# Patient Record
Sex: MALE | Race: BLACK OR AFRICAN AMERICAN | NOT HISPANIC OR LATINO | Employment: FULL TIME | URBAN - METROPOLITAN AREA
[De-identification: names, ages, dates, MRNs, and addresses within clinical notes are randomized per-mention and may not be internally consistent; named-entity substitution may affect disease eponyms.]

---

## 2018-10-08 ENCOUNTER — TELEPHONE (OUTPATIENT)
Dept: OBSTETRICS AND GYNECOLOGY | Facility: CLINIC | Age: 33
End: 2018-10-08

## 2018-10-08 RX ORDER — METRONIDAZOLE 500 MG/1
500 TABLET ORAL EVERY 12 HOURS
Qty: 14 TABLET | Refills: 0 | Status: SHIPPED | OUTPATIENT
Start: 2018-10-08 | End: 2018-10-15

## 2024-04-03 ENCOUNTER — HOSPITAL ENCOUNTER (EMERGENCY)
Facility: HOSPITAL | Age: 39
Discharge: HOME | End: 2024-04-03
Attending: EMERGENCY MEDICINE

## 2024-04-03 VITALS
TEMPERATURE: 98 F | DIASTOLIC BLOOD PRESSURE: 88 MMHG | HEART RATE: 79 BPM | BODY MASS INDEX: 37.77 KG/M2 | RESPIRATION RATE: 16 BRPM | SYSTOLIC BLOOD PRESSURE: 178 MMHG | WEIGHT: 285 LBS | OXYGEN SATURATION: 100 % | HEIGHT: 73 IN

## 2024-04-03 DIAGNOSIS — L02.91 ABSCESS: Primary | ICD-10-CM

## 2024-04-03 PROCEDURE — 10060 I&D ABSCESS SIMPLE/SINGLE: CPT

## 2024-04-03 PROCEDURE — 99282 EMERGENCY DEPT VISIT SF MDM: CPT

## 2024-04-03 RX ORDER — LIDOCAINE HYDROCHLORIDE 10 MG/ML
10 INJECTION INFILTRATION; PERINEURAL
Status: DISCONTINUED | OUTPATIENT
Start: 2024-04-03 | End: 2024-04-03 | Stop reason: HOSPADM

## 2024-04-03 RX ORDER — SULFAMETHOXAZOLE AND TRIMETHOPRIM 800; 160 MG/1; MG/1
2 TABLET ORAL 2 TIMES DAILY
Qty: 28 TABLET | Refills: 0 | Status: SHIPPED | OUTPATIENT
Start: 2024-04-03 | End: 2024-04-03 | Stop reason: CLARIF

## 2024-04-03 RX ORDER — SULFAMETHOXAZOLE AND TRIMETHOPRIM 800; 160 MG/1; MG/1
2 TABLET ORAL 2 TIMES DAILY
Qty: 28 TABLET | Refills: 0 | Status: SHIPPED | OUTPATIENT
Start: 2024-04-03 | End: 2024-04-03

## 2024-04-03 NOTE — ED PROVIDER NOTES
Encounter Date: 4/3/2024       History     Chief Complaint   Patient presents with    Cyst     Patient states he has a cyst x 1 week on his right side cheek , has had some discharge      Patient is a 38 y.o. male who presents to the ED 04/03/2024 with a chief complaint of Cyst to face for about a week.  Wife attempted to stick a needle in it and drain it at home.  She states some pus came out but it closed quickly.  He has not had any fever.  He has had 1 similar to this on his face in the past that resolved with warm compresses.  He states this 1 is slightly worse.  He states he otherwise feels well.  He denies any past medical problems or history or allergies.             Review of patient's allergies indicates:  No Known Allergies  No past medical history on file.  No past surgical history on file.  Family History   Problem Relation Age of Onset    Cancer Mother         breast cancer 48 at diagnosis; recurred at 52    No Known Problems Father     Diabetes Maternal Grandmother     Stroke Maternal Grandmother     Colon cancer Neg Hx     Prostate cancer Neg Hx     Hyperlipidemia Neg Hx     Hypertension Neg Hx     Heart disease Neg Hx      Social History     Tobacco Use    Smoking status: Never   Substance Use Topics    Alcohol use: Yes     Alcohol/week: 10.0 standard drinks of alcohol     Types: 10 Shots of liquor per week     Comment: 20 oz throughout the week    Drug use: No     Review of Systems   Constitutional:  Negative for chills, diaphoresis and fever.   HENT:  Negative for sore throat.    Respiratory:  Negative for chest tightness and shortness of breath.    Cardiovascular:  Negative for chest pain.   Genitourinary:  Negative for dysuria.   Musculoskeletal:  Negative for arthralgias and myalgias.   Skin:  Positive for wound. Negative for rash.   Hematological:  Does not bruise/bleed easily.       Physical Exam     Initial Vitals   BP Pulse Resp Temp SpO2   04/03/24 1602 04/03/24 1602 04/03/24 1602 04/03/24  "1604 04/03/24 1602   (!) 164/79 86 19 98.5 °F (36.9 °C) 97 %      MAP       --                Physical Exam    Nursing note and vitals reviewed.  Constitutional: He appears well-developed and well-nourished.   HENT:   Head: Normocephalic and atraumatic.       Right upper cheek with 2 x 2 cm area of fluctuant raised erythema with central pustule. No trismus. No orbital involvement. No directly overlying sinus.   Eyes: Conjunctivae are normal. Pupils are equal, round, and reactive to light. Right eye exhibits no discharge. Left eye exhibits no discharge.   Neck: Neck supple.   Normal range of motion.  Cardiovascular:  Normal rate, regular rhythm, normal heart sounds and intact distal pulses.           Pulmonary/Chest: Breath sounds normal.   Musculoskeletal:         General: Normal range of motion.      Cervical back: Normal range of motion and neck supple.     Neurological: He is alert and oriented to person, place, and time. He has normal strength. No sensory deficit.   Skin: Skin is warm and dry.   Psychiatric: He has a normal mood and affect.         ED Course   I & D - Incision and Drainage    Date/Time: 4/3/2024 3:44 PM  Location procedure was performed: [facility] EMERGENCY DEPARTMENT    Performed by: Chanda Hanson NP  Authorized by: Noah Fischer MD  Consent Done: Yes  Consent: Verbal consent obtained.  Risks and benefits: risks, benefits and alternatives were discussed  Consent given by: patient  Patient identity confirmed: name  Time out: Immediately prior to procedure a "time out" was called to verify the correct patient, procedure, equipment, support staff and site/side marked as required.  Type: abscess  Body area: head/neck  Location details: face  Anesthesia: local infiltration    Anesthesia:  Local Anesthetic: lidocaine 1% without epinephrine  Anesthetic total: 2 mL    Patient sedated: no  Scalpel size: 11  Incision type: single straight  Incision depth: dermal  Complexity: simple  Drainage: " pus  Drainage amount: moderate  Wound treatment: incision, drainage, deloculation, wound left open and expression of material  Complications: No  Specimens: No  Implants: No  Patient tolerance: Patient tolerated the procedure well with no immediate complications    Incision depth: dermal        Labs Reviewed - No data to display       Imaging Results    None          Medications   LIDOcaine HCL 10 mg/ml (1%) injection 10 mL (has no administration in time range)     Medical Decision Making  Risk  Prescription drug management.         APC / Resident Notes:   Patient is a 38 y.o. male who presents to the ED 04/03/2024 who underwent emergent evaluation for abscess to face for 1 week. No systemic signs of infection.  Patient well-appearing.  Discussed risk versus benefit of I and D and possible scarring related to this.  Patient would prefer I&D at this time.  I&D performed as above the patient tolerated well.  He is placed on Bactrim.  I do not think admission for IV antibiotics indicated at this time.  Do not think any deep space infection.  Not think any further imaging or CT indicated at this time.  His tetanus is up-to-date.  Recommend warm compresses, and Tylenol, and ibuprofen.  Based on my clinical evaluation, I do not appreciate any immediate, emergent, or life threatening condition or etiology that warrants additional workup today and feel that the patient can be discharged with close follow up care. Case discussed with Dr. Fischer who is agreeable to plan of care. Follow up and return precautions discussed; patient verbalized understanding and is agreeable to plan of care. Patient discharged home in stable condition.                             Medical Decision Making:   Differential Diagnosis:   Abscess  Folliculitis  cyst             Clinical Impression:  Final diagnoses:  [L02.91] Abscess (Primary)          ED Disposition Condition    Discharge Stable          ED Prescriptions       Medication Sig Dispense  Start Date End Date Auth. Provider    sulfamethoxazole-trimethoprim 800-160mg (BACTRIM DS) 800-160 mg Tab Take 2 tablets by mouth 2 (two) times daily. for 7 days 28 tablet 4/3/2024 4/10/2024 Chanda Hanson NP          Follow-up Information       Follow up With Specialties Details Why Contact Info Additional Information    Alok Calderon MD Internal Medicine In 1 week For wound re-check [address] [facility] [address]  [phone]       [facility] - ED Emergency Medicine  As needed, If symptoms worsen [address] [place] [address] Dr [place] [address] 1st floor             Chanda Hanson NP  04/03/24 5302

## 2024-04-03 NOTE — ED NOTES
Upon discharge, patient is AAOx4, no cardiac or respiratory complications. Follow up care and  Medications have been reviewed with patient and has been instructed to return to the ER if needed. Patient verbalized understanding and ambulated to the lobby without difficulty. MARCIN SILVA.

## 2024-04-03 NOTE — FIRST PROVIDER EVALUATION
Emergency Department TeleTriage Encounter Note      CHIEF COMPLAINT    Chief Complaint   Patient presents with    Cyst     Patient states he has a cyst x 1 week on his right side cheek , has had some discharge        VITAL SIGNS   Initial Vitals   BP Pulse Resp Temp SpO2   04/03/24 1602 04/03/24 1602 04/03/24 1602 04/03/24 1604 04/03/24 1602   (!) 164/79 86 19 98.5 °F (36.9 °C) 97 %      MAP       --                   ALLERGIES    Review of patient's allergies indicates:  No Known Allergies    PROVIDER TRIAGE NOTE  Patient presents with complaint of abscess noted to face.  Reports no fevers      Phy:   Constitutional: well nourished, well developed, appearing stated age, NAD        Initial orders will be placed and care will be transferred to an alternate provider when patient is roomed for a full evaluation. Any additional orders and the final disposition will be determined by that provider.        ORDERS  Labs Reviewed - No data to display    ED Orders (720h ago, onward)      None              Virtual Visit Note: The provider triage portion of this emergency department evaluation and documentation was performed via [employer], a HIPAA-compliant telemedicine application, in concert with a tele-presenter in the room. A face to face patient evaluation with one of my colleagues will occur once the patient is placed in an emergency department room.      DISCLAIMER: This note was prepared with [employer] voice recognition transcription software. Garbled syntax, mangled pronouns, and other bizarre constructions may be attributed to that software system.

## 2024-04-03 NOTE — ED NOTES
Redd Clark presents to the ED with c/o abscess to right cheek. Patient reports that he sometimes gets an ingrown hair. Abscess is draining at this time. Patient denies any fever. There is erythema to [place] wound. Mucous membranes are pink and moist. Skin is warm, dry and intact. SHIRA VSS  Verified patient's name and date of birth.

## 2024-10-30 ENCOUNTER — PATIENT MESSAGE (OUTPATIENT)
Dept: RESEARCH | Facility: HOSPITAL | Age: 39
End: 2024-10-30

## 2024-11-21 ENCOUNTER — OFFICE VISIT (OUTPATIENT)
Dept: URGENT CARE | Facility: CLINIC | Age: 39
End: 2024-11-21
Payer: COMMERCIAL

## 2024-11-21 VITALS
HEIGHT: 73 IN | DIASTOLIC BLOOD PRESSURE: 83 MMHG | SYSTOLIC BLOOD PRESSURE: 142 MMHG | WEIGHT: 285 LBS | BODY MASS INDEX: 37.77 KG/M2 | OXYGEN SATURATION: 97 % | HEART RATE: 75 BPM | RESPIRATION RATE: 18 BRPM | TEMPERATURE: 98 F

## 2024-11-21 DIAGNOSIS — R21 RASH OF HAND: Primary | ICD-10-CM

## 2024-11-21 DIAGNOSIS — Z11.3 SCREENING EXAMINATION FOR STI: ICD-10-CM

## 2024-11-21 LAB
HCV AB SERPL QL IA: NORMAL
HIV 1+2 AB+HIV1 P24 AG SERPL QL IA: NORMAL
TREPONEMA PALLIDUM IGG+IGM AB [PRESENCE] IN SERUM OR PLASMA BY IMMUNOASSAY: NONREACTIVE

## 2024-11-21 PROCEDURE — 87389 HIV-1 AG W/HIV-1&-2 AB AG IA: CPT

## 2024-11-21 PROCEDURE — 86803 HEPATITIS C AB TEST: CPT

## 2024-11-21 PROCEDURE — 86593 SYPHILIS TEST NON-TREP QUANT: CPT

## 2024-11-21 RX ORDER — BETAMETHASONE DIPROPIONATE 0.5 MG/G
OINTMENT TOPICAL 2 TIMES DAILY
Qty: 45 G | Refills: 0 | Status: SHIPPED | OUTPATIENT
Start: 2024-11-21 | End: 2024-11-21

## 2024-11-21 RX ORDER — BETAMETHASONE DIPROPIONATE 0.5 MG/G
OINTMENT TOPICAL 2 TIMES DAILY
Qty: 45 G | Refills: 0 | Status: SHIPPED | OUTPATIENT
Start: 2024-11-21

## 2024-11-21 NOTE — PATIENT INSTRUCTIONS
Apply steroid cream as directed (wear gloves at night to keep the moisture in). Try an oral anti-histamine like zyrtec during the day for itching. Over the counter Pepcid twice per day can be helpful (helps decrease inflammation).  You can try benadryl at night for itching. This will make you drowsy. Make sure not to drive, drink alcohol, operate machinery or take other sedating medications while taking this.      Apply ice packs or wet cloths to the rash to reduce itching. Keep cool and stay out of the sun. Leave rash open to the air as much as you can. Avoid using fragrant soaps as this can cause irritation. Only use gentle soaps. Stay moisturized. Monitor for triggers and avoid them.     Alternate tylenol and ibuprofen every 4 hours for pain and to decrease inflammation. Always take ibuprofen with food and water to decrease GI upset. Stop taking ibuprofen if you develop abdominal pain or blood in stool.     Drink plenty of fluids daily (water is best).   The recommended daily fluid intake for men is 3.7 liters (seven 16 oz bottles).    Eat nutritious foods high in antioxidants that include fruits and vegetables. Try a Mediterranean diet to decrease inflammation and will help boost your immune system.     Getting enough sleep at night (7-8 hours), eating nutritious foods, managing stress levels and maintaining physical activity (3 days a week of 45 min of aerobic activity) can all help your immune system.    Follow up with PCP for continued symptoms in 1 week.     Go to the ER if you have shortness of breath, mouth/throat swelling, chest pain, palpitations, dizziness.               ([facility] recommendation)  The Mediterranean diet is based on plant-based foods and healthy fats.     You eat LOTS of vegetables, fruits, beans, lentils, nuts, whole grains (wheat bread, brown rice) & extra virgin olive oil.   ---- These foods are high in fiber and antioxidants.   ---- These nutrients help reduce inflammation.   ----  Fiber helps regulate bowel movements.   ---- Antioxidants protect you against cancer.     Eat a moderate amount of fish (salmon, herring, mackerel, sardines, anchovies, halibut, rainbow trout, tuna)   ---- (fish are high in omega-3 fatty acids)    Eat a moderate amount of cheese and yogurt.    Eat little or no meat-- eat more poultry (baked chicken, grilled chicken, ground turkey)  ---- avoid red meat and pork (causes inflammation and linked to colon cancer)    Eat little or no sweats, sugary drinks or butter.     Limit your sodium intake. A diet high in salt can increase your blood pressure and predisposes you to a heart attack or stroke.     BENEFITS OF DIET  --- Lowers your risk of cardiovascular disease (heart attack, stroke) and many other diseases.   --- Supports a healthy body weight.   --- Supports a healthy blood sugar, blood pressure and cholesterol.   --- Lowers your risk of metabolic syndrome   --- Supports a heathy balance of good gut bacteria in your digestive system.   --- Lowers your risk for cancer.   --- Helps with brain function and slows decline as we age.   --- Helps you live longer.     Talk to your primary care doctor about a dietician referral for further guidance.

## 2024-11-21 NOTE — PROGRESS NOTES
"Subjective:      Patient ID: Redd Clark is a 39 y.o. male.    Vitals:  height is 6' 1" (1.854 m) and weight is 129.3 kg (285 lb). His oral temperature is 97.8 °F (36.6 °C). His blood pressure is 142/83 (abnormal) and his pulse is 75. His respiration is 18 and oxygen saturation is 97%.     Chief Complaint: Rash    40 yo male with PMH varicella c/o bilateral hand rash. Sx began yesterday. Pt reports tingling sensation on hands when flexing/stretching, itchiness, painful to touch (sore), and swelling. Pt presents with macular rash. Pt reports 2 days ago he was feeling light headed and sweats, reports no fever and sx went away. Pt denies environmental exposure (admits using hands heavily at work but states he typically uses gloves and up keeps hand hygiene). Pt denies congestion, cough, diarrhea, congestion, chest pain, abdominal pain, fatigue, fever, rhinorrhea, SOB, sore throat, nausea, and emesis. Pt reports having chicken pox as a child, denies shingles vaccination. Pt reports self medicating with antibacterial cream with no relief. Pt reports pain level is a 2.    Rash  This is a new problem. The current episode started yesterday. The problem is unchanged. The affected locations include the left hand and right hand. The rash is characterized by itchiness, redness and swelling. He was exposed to nothing. Pertinent negatives include no anorexia, congestion, cough, diarrhea, eye pain, facial edema, fatigue, fever, joint pain, nail changes, rhinorrhea, shortness of breath, sore throat or vomiting. Past treatments include antibiotic cream. The treatment provided no relief. There is no history of allergies, asthma, eczema or varicella.       Constitution: Negative for fatigue and fever.   HENT:  Negative for congestion and sore throat.    Eyes:  Negative for eye pain.   Respiratory:  Negative for cough and shortness of breath.    Gastrointestinal:  Negative for vomiting and diarrhea.   Genitourinary:  Negative " for dysuria, genital sore, penile discharge, penile pain, penile swelling, scrotal swelling and pelvic pain.   Musculoskeletal:  Positive for pain. Negative for trauma, abnormal ROM of joint and arthritis.   Skin:  Positive for rash. Negative for erythema.   Allergic/Immunologic: Positive for itching.      Objective:     Physical Exam   Constitutional: He is oriented to person, place, and time. He appears well-developed.   HENT:   Head: Normocephalic and atraumatic. Head is without abrasion, without contusion and without laceration.   Ears:   Right Ear: External ear normal.   Left Ear: External ear normal.   Nose: Nose normal.   Mouth/Throat: Oropharynx is clear and moist and mucous membranes are normal.   Eyes: Conjunctivae, EOM and lids are normal. Pupils are equal, round, and reactive to light.   Neck: Trachea normal and phonation normal. Neck supple.   Cardiovascular: Normal rate, regular rhythm and normal heart sounds.   Pulmonary/Chest: Effort normal and breath sounds normal. No stridor. No respiratory distress.   Musculoskeletal: Normal range of motion.         General: Normal range of motion.   Neurological: He is alert and oriented to person, place, and time.   Skin: Skin is warm, dry, intact, rash (macular rash to bilateral palms, swelling to hands, mild TTP, no drainage) and macular. Capillary refill takes less than 2 seconds. No abrasion, No burn, No bruising, No erythema and No ecchymosis   Psychiatric: His speech is normal and behavior is normal. Judgment and thought content normal.   Nursing note and vitals reviewed.        Assessment:     1. Rash of hand    2. Screening examination for STI        Plan:       Rash of hand  -     Treponema Pallidium Antibodies IgG, IgM  -     betamethasone dipropionate (DIPROLENE) 0.05 % ointment; Apply topically 2 (two) times daily.  Dispense: 45 g; Refill: 0    Screening examination for STI  -     HIV 1/2 Ag/Ab (4th Gen)  -     Hepatitis C Antibody    Other orders  -      Discontinue: betamethasone dipropionate (DIPROLENE) 0.05 % ointment; Apply topically 2 (two) times daily.  Dispense: 45 g; Refill: 0            Discussed results/diagnosis/plan with patient in clinic. Strict precautions given to patient to monitor for worsening signs and symptoms. Advised to follow up with PCP or specialist.  Explained side effects of medications prescribed with patient and informed him/her to discontinue use if he/she has any side effects and to inform UC or PCP if this occurs. All questions answered. Strict ED verses clinic return precautions stressed and given in depth. Advised if symptoms worsens of fail to improve he/she should go to the Emergency Room. Discharge and follow-up instructions given verbally/printed with the patient who expressed understanding and willingness to comply with my recommendations. Patient voiced understanding and in agreement with current treatment plan. Patient exits the exam room in no acute distress. Conversant and engaged during discharge discussion, verbalized understanding.

## 2024-12-30 ENCOUNTER — TELEPHONE (OUTPATIENT)
Dept: PODIATRY | Facility: CLINIC | Age: 39
End: 2024-12-30
Payer: COMMERCIAL

## 2024-12-30 ENCOUNTER — OFFICE VISIT (OUTPATIENT)
Dept: URGENT CARE | Facility: CLINIC | Age: 39
End: 2024-12-30
Payer: COMMERCIAL

## 2024-12-30 VITALS
HEIGHT: 73 IN | SYSTOLIC BLOOD PRESSURE: 131 MMHG | OXYGEN SATURATION: 98 % | RESPIRATION RATE: 18 BRPM | WEIGHT: 314.63 LBS | TEMPERATURE: 98 F | DIASTOLIC BLOOD PRESSURE: 83 MMHG | HEART RATE: 93 BPM | BODY MASS INDEX: 41.7 KG/M2

## 2024-12-30 DIAGNOSIS — M79.671 RIGHT FOOT PAIN: ICD-10-CM

## 2024-12-30 DIAGNOSIS — M72.2 PLANTAR FASCIITIS OF RIGHT FOOT: Primary | ICD-10-CM

## 2024-12-30 PROCEDURE — 73630 X-RAY EXAM OF FOOT: CPT | Mod: RT,S$GLB,, | Performed by: RADIOLOGY

## 2024-12-30 RX ORDER — KETOROLAC TROMETHAMINE 30 MG/ML
30 INJECTION, SOLUTION INTRAMUSCULAR; INTRAVENOUS
Status: COMPLETED | OUTPATIENT
Start: 2024-12-30 | End: 2024-12-30

## 2024-12-30 RX ORDER — IBUPROFEN 800 MG/1
800 TABLET ORAL 3 TIMES DAILY
Qty: 15 TABLET | Refills: 0 | Status: SHIPPED | OUTPATIENT
Start: 2024-12-30 | End: 2025-01-04

## 2024-12-30 RX ADMIN — KETOROLAC TROMETHAMINE 30 MG: 30 INJECTION, SOLUTION INTRAMUSCULAR; INTRAVENOUS at 11:12

## 2024-12-30 NOTE — LETTER
December 30, 2024      [facility] [address] [address]  Phone: [phone]  Fax: [phone]       Patient: Redd Clark   YOB: 1985  Date of Visit: 12/30/2024    To Whom It May Concern:    Dale Clark  was at [facility] on 12/30/2024. The patient may return to work/school on 12/31/2024 with no restrictions. If you have any questions or concerns, or if I can be of further assistance, please do not hesitate to contact me.    Sincerely,    Joslyn Moya, DNP

## 2024-12-30 NOTE — PROGRESS NOTES
"Subjective:      Patient ID: Redd Clark is a 39 y.o. male.    Vitals:  height is 6' 1" (1.854 m) and weight is 142.7 kg (314 lb 9.5 oz) (abnormal). His oral temperature is 98.2 °F (36.8 °C). His blood pressure is 131/83 and his pulse is 93. His respiration is 18 and oxygen saturation is 98%.     Chief Complaint: Pain    Pt presents today w/ rt foot pain ; onset approx 2x weeks ago. Pt c/o slight edema and decrease in ability to bear full weight ; denies any direct trauma , loss of ROM and loss of sensation. Pt reports pain to the sides and heel of foot. 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 Pt tried arthritis pain reliever cream and warm compress;no relief.      Pain  This is a new problem. The current episode started 1 to 4 weeks ago. The problem occurs constantly. The problem has been unchanged. Associated symptoms include joint swelling. Pertinent negatives include no abdominal pain, anorexia, arthralgias, change in bowel habit, chest pain, chills, congestion, coughing, diaphoresis, fatigue, fever, headaches, myalgias, nausea, neck pain, numbness, rash, sore throat, swollen glands, urinary symptoms, vertigo, visual change, vomiting or weakness. The symptoms are aggravated by exertion, walking and standing. He has tried ice (arthritsis pain reliver) for the symptoms. The treatment provided no relief.       Constitution: Negative for chills, sweating, fatigue and fever.   HENT:  Negative for congestion and sore throat.    Neck: Negative for neck pain.   Cardiovascular:  Negative for chest pain.   Respiratory:  Negative for cough.    Gastrointestinal:  Negative for abdominal pain, nausea and vomiting.   Musculoskeletal:  Positive for joint swelling. Negative for joint pain and muscle ache.        Tenderness to plantar medial aspect of heel.   Skin:  Negative for rash.   Neurological:  Negative for history of vertigo, headaches and numbness.      Objective: "     Physical Exam   Constitutional: He is oriented to person, place, and time. He appears well-developed. He is cooperative. awake  HENT:   Head: Normocephalic and atraumatic.   Ears:   Right Ear: Hearing and external ear normal.   Left Ear: Hearing and external ear normal.   Nose: Nose normal.   Mouth/Throat: Oropharynx is clear and moist.   Eyes: Conjunctivae, EOM and lids are normal. Pupils are equal, round, and reactive to light. Extraocular movement intact   Neck: Trachea normal and phonation normal. Neck supple. No thyromegaly present.   Cardiovascular: Normal rate, regular rhythm, S1 normal, S2 normal, normal heart sounds and normal pulses.   Pulmonary/Chest: Effort normal and breath sounds normal. He has no decreased breath sounds. He has no wheezes. He has no rhonchi. He has no rales.   Abdominal: Normal appearance and bowel sounds are normal. Soft. flat abdomen   Musculoskeletal: Normal range of motion.         General: Normal range of motion.      Right lower leg: No edema.      Left lower leg: No edema.   Lymphadenopathy:     He has no cervical adenopathy.   Neurological: no focal deficit. He is alert and oriented to person, place, and time.   Skin: Skin is warm, dry and intact. Capillary refill takes less than 2 seconds.   Psychiatric: His speech is normal and behavior is normal. Mood, judgment and thought content normal.   Nursing note and vitals reviewed.    XR FOOT COMPLETE 3 VIEW RIGHT    Result Date: 12/30/2024  EXAMINATION: XR FOOT COMPLETE 3 VIEW RIGHT CLINICAL HISTORY: . Pain in right foot TECHNIQUE: AP, lateral, and oblique views of the right foot were performed. COMPARISON: None FINDINGS: Bones are well mineralized.  Overall alignment is within normal limits.  Lisfranc articulation is congruent.  No displaced fracture, dislocation or destructive osseous process.  Minimal DJD at the 1st MTP joint and dorsal midfoot.  Plantar calcaneal spur and enthesophyte at the Achilles insertion site noted.   No subcutaneous emphysema or radiopaque foreign body.     No acute displaced fracture-dislocation identified. Electronically signed by: Ronak Simmons MD Date:    12/30/2024 Time:    12:13     Assessment:     1. Plantar fasciitis of right foot    2. Right foot pain      Patient called and informed of findings for x-ray to the right foot.  Patient is to keep referral appointment to podiatry.  Plan:       Plantar fasciitis of right foot    Right foot pain  -     XR FOOT COMPLETE 3 VIEW RIGHT; Future; Expected date: 12/30/2024  -     ketorolac injection 30 mg  -     Ambulatory referral/consult to Podiatry    Other orders  -     ibuprofen (ADVIL,MOTRIN) 800 MG tablet; Take 1 tablet (800 mg total) by mouth 3 (three) times daily. for 5 days  Dispense: 15 tablet; Refill: 0      Patient Instructions   Discharge instructions plantar Fasciitis    What are the symptoms of plantar fasciitis? -- The most common symptom is pain under the heel and sole (bottom) of the foot. The pain is often worst when you first get out of bed in the morning. It can also be bad when you get up after being seated for some time.  Is there anything I can do on my own to feel better? -- Yes, you can:  Rest - Give your foot a chance to heal by resting. But don't completely stop being active. Doing that can lead to more pain and stiffness in the long run.  Ice your foot - Putting ice on your heel for 20 minutes up to 4 times a day might relieve pain. Icing and massaging your foot before exercise might also help.  Do special foot exercises - Certain exercises can help with heel pain. Do these exercises every day (figure 2).  Take pain medicines - If your pain is severe, you can try taking pain medicines that you can get without a prescription. Examples include ibuprofen (sample brand names: Advil, Motrin) and naproxen (sample brand name: Aleve). But if you have other medical conditions or already take other medicines, ask your doctor or nurse before taking  new pain medicines.  Wear sturdy shoes - Sneakers with a lot of cushion and good arch and heel support are best. Shoes with rigid soles can also help. Adding padded or gel heel inserts to your shoes might help, too.  Wear splints at night - Some people feel better if they wear a splint while they sleep that keeps their foot straight. These splints are sold in [employer] and medical supply stores.    1) See orders for this visit as documented in the electronic medical record.  2) Symptomatic therapy suggested: use acetaminophen/ibuprofen every 6-8 hours prn pain or fever, push fluids.   3) Call or return to clinic prn if these symptoms worsen or fail to improve as anticipated.    Discussed results/diagnosis/plan with patient in clinic.  We had shared decision making for patient's treatment. Patient verbalized understanding and in agreement with current treatment plan.     Patient was instructed to return for re-evaluation with urgent care or PCP for continued outpatient workup and management if symptoms do not improve/worsening symptoms. Strict ED versus clinic precautions given in depth.    Discharge and follow-up instructions given verbally/printed with the patient who expressed understanding. The instructions and results are also available on [employer].      - You must understand that you have received an Urgent Care treatment only and that you may be released before all of your medical problems are known or treated.   - You, the patient, will arrange for follow up care as instructed.   - Follow up with your PCP or specialty clinic as directed in the next 1-2 weeks if not improved or as needed.  You can call [phone] to schedule an appointment with the appropriate provider.   - If your condition worsens or fails to improve we recommend that you receive another evaluation at the ER immediately or contact your PCP to discuss your concerns or return here.        STEF Sutton

## 2024-12-30 NOTE — TELEPHONE ENCOUNTER
----- Message from Juana sent at 12/30/2024 11:59 AM CST -----  Regarding: Urgent-Right foot pain [M79.671]  Urgent-Right foot pain [M79.671]    12/30/24-Called pt to schedule urgent referral that has been scheduled for 1/8/25 at 10:00. There were no earlier appts available for scheduling. Pt contact # is [phone]

## 2024-12-30 NOTE — PATIENT INSTRUCTIONS
Discharge instructions plantar Fasciitis    What are the symptoms of plantar fasciitis? -- The most common symptom is pain under the heel and sole (bottom) of the foot. The pain is often worst when you first get out of bed in the morning. It can also be bad when you get up after being seated for some time.  Is there anything I can do on my own to feel better? -- Yes, you can:  Rest - Give your foot a chance to heal by resting. But don't completely stop being active. Doing that can lead to more pain and stiffness in the long run.  Ice your foot - Putting ice on your heel for 20 minutes up to 4 times a day might relieve pain. Icing and massaging your foot before exercise might also help.  Do special foot exercises - Certain exercises can help with heel pain. Do these exercises every day (figure 2).  Take pain medicines - If your pain is severe, you can try taking pain medicines that you can get without a prescription. Examples include ibuprofen (sample brand names: Advil, Motrin) and naproxen (sample brand name: Aleve). But if you have other medical conditions or already take other medicines, ask your doctor or nurse before taking new pain medicines.  Wear sturdy shoes - Sneakers with a lot of cushion and good arch and heel support are best. Shoes with rigid soles can also help. Adding padded or gel heel inserts to your shoes might help, too.  Wear splints at night - Some people feel better if they wear a splint while they sleep that keeps their foot straight. These splints are sold in drugstores and medical supply stores.    1) See orders for this visit as documented in the electronic medical record.  2) Symptomatic therapy suggested: use acetaminophen/ibuprofen every 6-8 hours prn pain or fever, push fluids.   3) Call or return to clinic prn if these symptoms worsen or fail to improve as anticipated.    Discussed results/diagnosis/plan with patient in clinic.  We had shared decision making for patient's treatment.  Patient verbalized understanding and in agreement with current treatment plan.     Patient was instructed to return for re-evaluation with urgent care or PCP for continued outpatient workup and management if symptoms do not improve/worsening symptoms. Strict ED versus clinic precautions given in depth.    Discharge and follow-up instructions given verbally/printed with the patient who expressed understanding. The instructions and results are also available on [employer].      - You must understand that you have received an Urgent Care treatment only and that you may be released before all of your medical problems are known or treated.   - You, the patient, will arrange for follow up care as instructed.   - Follow up with your PCP or specialty clinic as directed in the next 1-2 weeks if not improved or as needed.  You can call [phone] to schedule an appointment with the appropriate provider.   - If your condition worsens or fails to improve we recommend that you receive another evaluation at the ER immediately or contact your PCP to discuss your concerns or return here.        STEF Sutton

## 2025-01-08 ENCOUNTER — OFFICE VISIT (OUTPATIENT)
Dept: PODIATRY | Facility: CLINIC | Age: 40
End: 2025-01-08
Payer: COMMERCIAL

## 2025-01-08 VITALS — WEIGHT: 315 LBS | BODY MASS INDEX: 41.75 KG/M2 | HEIGHT: 73 IN | RESPIRATION RATE: 18 BRPM

## 2025-01-08 DIAGNOSIS — M21.41 PES PLANUS OF BOTH FEET: ICD-10-CM

## 2025-01-08 DIAGNOSIS — M21.42 PES PLANUS OF BOTH FEET: ICD-10-CM

## 2025-01-08 DIAGNOSIS — M72.2 PLANTAR FASCIITIS OF RIGHT FOOT: ICD-10-CM

## 2025-01-08 DIAGNOSIS — M79.671 CHRONIC FOOT PAIN, RIGHT: ICD-10-CM

## 2025-01-08 DIAGNOSIS — M21.6X1 EQUINUS DEFORMITY OF RIGHT FOOT: ICD-10-CM

## 2025-01-08 DIAGNOSIS — M76.821 POSTERIOR TIBIAL TENDON DYSFUNCTION (PTTD) OF RIGHT LOWER EXTREMITY: Primary | ICD-10-CM

## 2025-01-08 DIAGNOSIS — G89.29 CHRONIC FOOT PAIN, RIGHT: ICD-10-CM

## 2025-01-08 PROCEDURE — 99999 PR PBB SHADOW E&M-EST. PATIENT-LVL III: CPT | Mod: PBBFAC,,,

## 2025-01-08 PROCEDURE — 99204 OFFICE O/P NEW MOD 45 MIN: CPT | Mod: S$GLB,,,

## 2025-01-08 PROCEDURE — 1159F MED LIST DOCD IN RCRD: CPT | Mod: CPTII,S$GLB,,

## 2025-01-08 PROCEDURE — 3008F BODY MASS INDEX DOCD: CPT | Mod: CPTII,S$GLB,,

## 2025-01-08 PROCEDURE — 1160F RVW MEDS BY RX/DR IN RCRD: CPT | Mod: CPTII,S$GLB,,

## 2025-01-08 RX ORDER — MELOXICAM 15 MG/1
15 TABLET ORAL DAILY
Qty: 30 TABLET | Refills: 3 | Status: SHIPPED | OUTPATIENT
Start: 2025-01-08

## 2025-01-08 RX ORDER — METHYLPREDNISOLONE 4 MG/1
TABLET ORAL
Qty: 21 EACH | Refills: 0 | Status: SHIPPED | OUTPATIENT
Start: 2025-01-08 | End: 2025-01-29

## 2025-01-08 NOTE — PROGRESS NOTES
"  [address]  [place] [address]  Phone: [phone]   [phone]    Patient's PCP:Alok Calderon MD  Referring Provider: Joslyn Moya    Subjective:      Chief Complaint:: Left heel and medial ankle pain Foot Pain     HPI  Redd Clark is a 39 y.o. male who presents today with a complaint of left foot pain aching in arch and heel occasional dorsal surface fore foot. The current episode started over one year ago.  The symptoms include aching, swelling, throbbing, and burning. Probable cause of complaint unknown.  The symptoms are aggravated by walking weightbearing prolonged use. The problem has progressively worsened. Treatment to date have included massage, epsom salt soaks, tylenol, compression which provided no relief.       Vitals:    01/08/25 0957   Resp: 18   Weight: (!) 146 kg (321 lb 14 oz)   Height: 6' 1" (1.854 m)   PainSc:   2      Shoe Size: 12.5-13    History reviewed. No pertinent surgical history.  History reviewed. No pertinent past medical history.  Family History   Problem Relation Name Age of Onset    Cancer Mother          breast cancer 48 at diagnosis; recurred at 52    No Known Problems Father      Diabetes Maternal Grandmother      Stroke Maternal Grandmother      Colon cancer Neg Hx      Prostate cancer Neg Hx      Hyperlipidemia Neg Hx      Hypertension Neg Hx      Heart disease Neg Hx          Social History:   Marital Status: Single  Alcohol History:  reports current alcohol use of about 10.0 standard drinks of alcohol per week.  Tobacco History:  reports that he has never smoked. He has never been exposed to tobacco smoke. He has never used smokeless tobacco.  Drug History:  reports current drug use. Drug: Marijuana.    Review of patient's allergies indicates:  No Known Allergies    Current Outpatient Medications   Medication Sig Dispense Refill    betamethasone dipropionate (DIPROLENE) 0.05 % ointment Apply topically 2 (two) times daily. (Patient not " taking: Reported on 12/30/2024) 45 g 0    meloxicam (MOBIC) 15 MG tablet Take 1 tablet (15 mg total) by mouth once daily. 30 tablet 3     No current facility-administered medications for this visit.       Review of Systems   Constitutional:  Negative for activity change, chills and fever.   Cardiovascular:  Negative for chest pain, palpitations and leg swelling.   Gastrointestinal:  Negative for diarrhea, nausea and vomiting.   Musculoskeletal:  Negative for arthralgias, back pain, gait problem, joint swelling and myalgias.   Skin:  Negative for color change, pallor, rash and wound.   Neurological:  Negative for dizziness, tremors, weakness and numbness.         Objective:        Physical Exam:   Foot Exam    General  General Appearance: appears stated age and healthy   Orientation: alert and oriented to person, place, and time   Affect: appropriate       Right Foot/Ankle     Inspection and Palpation  Ecchymosis: none  Tenderness: calcaneus tenderness, plantar fascia and ankle (Along the PT tendon at the tarsal tunnel to its insertion and origin of PF)  Swelling: none   Arch: pes planus  Hammertoes: fourth toe and fifth toe (reducible)  Hallux valgus: no  Hallux limitus: no  Skin Exam: skin intact;   Fungus Toenails: not present  Neurovascular  Dorsalis pedis: 2+  Posterior tibial: 2+  Capillary Refill: 3+  Saphenous nerve sensation: normal  Tibial nerve sensation: normal  Superficial peroneal nerve sensation: normal  Deep peroneal nerve sensation: normal  Sural nerve sensation: normal    Muscle Strength  Ankle dorsiflexion: 5  Ankle plantar flexion: 5  Ankle inversion: 5  Ankle eversion: 5  Great toe extension: 5  Great toe flexion: 5    Range of Motion    Passive  Ankle dorsiflexion: 0  Ankle eversion: 10  Ankle inversion: 20  1st MTP extension: 75      Tests  Anterior drawer: negative   Varus tilt: negative   Calcaneal squeeze: negative   Talar tilt: negative   PT Tinel's sign: negative    Heel raise: pain (Could  not preform due to pain)  Valleix sign: negative  Comments  Tenderness with palpation to the plantar heel medial calcaneal tuberosity, at the origin of the plantar fascia.   Negative calcaneal squeeze test  Negative Tinel's sign at the tarsal tunnel   Negative calcaneal nerve pain   No adjacent or associated soft tissue mass   No pain with ankle joint dorsiflexion   No pain with palpation of the posterior heel   Ne edema, erythema, or ecchymosis appreciated     Tenderness along the PT tendon at the tarsal tunnel to its insertion, pain with resisted inversion and eversion at this same location.     Ankle joint limited dorsiflexory range of motion; 0 degrees or less of passive AJ DF, 0 - 5 degrees of passive AJ DF with knee extended             Imaging:   EXAMINATION:  XR FOOT COMPLETE 3 VIEW RIGHT     CLINICAL HISTORY:  . Pain in right foot     TECHNIQUE:  AP, lateral, and oblique views of the right foot were performed.     COMPARISON:  None     FINDINGS:  Bones are well mineralized.  Overall alignment is within normal limits.  Lisfranc articulation is congruent.  No displaced fracture, dislocation or destructive osseous process.  Minimal DJD at the 1st MTP joint and dorsal midfoot.  Plantar calcaneal spur and enthesophyte at the Achilles insertion site noted.  No subcutaneous emphysema or radiopaque foreign body.     Impression:     No acute displaced fracture-dislocation identified.        Electronically signed by:Ronak Simmons MD  Date:                                            12/30/2024  Time:                                           12:13         Assessment:       1. Posterior tibial tendon dysfunction (PTTD) of right lower extremity    2. Plantar fasciitis of right foot    3. Pes planus of both feet    4. Equinus deformity of right foot    5. Chronic foot pain, right      Plan:   Posterior tibial tendon dysfunction (PTTD) of right lower extremity  -     AIR CAST WALKER BOOT FOR HOME USE  -      methylPREDNISolone (MEDROL DOSEPACK) 4 mg tablet; use as directed  Dispense: 21 each; Refill: 0  -     meloxicam (MOBIC) 15 MG tablet; Take 1 tablet (15 mg total) by mouth once daily.  Dispense: 30 tablet; Refill: 3    Plantar fasciitis of right foot  -     methylPREDNISolone (MEDROL DOSEPACK) 4 mg tablet; use as directed  Dispense: 21 each; Refill: 0  -     meloxicam (MOBIC) 15 MG tablet; Take 1 tablet (15 mg total) by mouth once daily.  Dispense: 30 tablet; Refill: 3    Pes planus of both feet    Equinus deformity of right foot    Chronic foot pain, right      Right foot xray obtained, reviewed, independently interpreted above, and discussed with patient.    Mild joint space narrowing and metatarsal flattening at the 1st MTPJ. Moderate plantar heel spur. Posterior heel ensthsiopathy of the achilles. Pes planus foot type.    Patient will WB in CAM fracture boot for the next 3-4 weeks.1st day of the 4th week, patient can try to WB outside of boot into standard shoe gear with a rigid foot orthosis, recommendations provided.  We discussed physical therapy, and possibility of surgical interventional if the symptoms do not improve with conservative management   Patient will perform gently tendo achilles complex passive ROM stretches twice a day until while in the CAM boot, using pain as a guide, in order to prevent further contracture of the achilles tendon while in CAM boot.   Gentle eccentric PT tendon exercises as discussed   Most recent CBC, CMP, HgA1c, and current medications reviewed.   Medrol Dose pack for the next week   Week 2 start  Meloxicam dispensed to patient's pharmacy to be taken as directed to help progress healing out of inflammatory state.   Declined PT at this time  Dicussed MRI if not improvement   Follow up in 4-6 weeks or sooner as needed  This note was created using Dragon voice recognition software that occasionally misinterpreted phrases or words.     Mhaesh Farias DPM  Podiatry / Foot and  Ankle Surgery   Secure chat preferred